# Patient Record
Sex: FEMALE | Race: WHITE | NOT HISPANIC OR LATINO | Employment: FULL TIME | URBAN - NONMETROPOLITAN AREA
[De-identification: names, ages, dates, MRNs, and addresses within clinical notes are randomized per-mention and may not be internally consistent; named-entity substitution may affect disease eponyms.]

---

## 2017-01-09 ENCOUNTER — OFFICE VISIT (OUTPATIENT)
Dept: NEUROSURGERY | Facility: CLINIC | Age: 52
End: 2017-01-09

## 2017-01-09 VITALS — HEIGHT: 64 IN | WEIGHT: 263 LBS | BODY MASS INDEX: 44.9 KG/M2

## 2017-01-09 DIAGNOSIS — M50.30 DDD (DEGENERATIVE DISC DISEASE), CERVICAL: ICD-10-CM

## 2017-01-09 DIAGNOSIS — M51.36 DDD (DEGENERATIVE DISC DISEASE), LUMBAR: ICD-10-CM

## 2017-01-09 DIAGNOSIS — M48.062 SPINAL STENOSIS, LUMBAR REGION, WITH NEUROGENIC CLAUDICATION: Primary | ICD-10-CM

## 2017-01-09 PROBLEM — M48.061 SPINAL STENOSIS, LUMBAR REGION, WITHOUT NEUROGENIC CLAUDICATION: Status: ACTIVE | Noted: 2017-01-09

## 2017-01-09 PROCEDURE — 99243 OFF/OP CNSLTJ NEW/EST LOW 30: CPT | Performed by: NEUROLOGICAL SURGERY

## 2017-01-09 RX ORDER — GABAPENTIN 600 MG/1
TABLET ORAL
COMMUNITY
Start: 2016-12-19

## 2017-01-09 RX ORDER — LEVOTHYROXINE SODIUM 0.03 MG/1
TABLET ORAL
COMMUNITY
Start: 2016-12-19

## 2017-01-09 RX ORDER — LISINOPRIL AND HYDROCHLOROTHIAZIDE 12.5; 1 MG/1; MG/1
TABLET ORAL
COMMUNITY
Start: 2016-12-19

## 2017-01-09 RX ORDER — MELOXICAM 7.5 MG/1
TABLET ORAL
COMMUNITY
Start: 2017-01-06

## 2017-01-09 RX ORDER — TRAMADOL HYDROCHLORIDE 50 MG/1
TABLET ORAL
COMMUNITY
Start: 2016-12-19

## 2017-01-09 NOTE — MR AVS SNAPSHOT
Naomi Reyes   2017 12:15 PM   Office Visit    Dept Phone:  [phone]   Encounter #:  [id number]    Provider:  Luis A Lopez MD   Department:  [facility]                Your Full Care Plan              Your Updated Medication List          This list is accurate as of: 17 11:59 PM.  Always use your most recent med list.                gabapentin 600 MG tablet   Commonly known as:  NEURONTIN       levothyroxine 25 MCG tablet   Commonly known as:  SYNTHROID, LEVOTHROID       lisinopril-hydrochlorothiazide 10-12.5 MG per tablet   Commonly known as:  PRINZIDE,ZESTORETIC       meloxicam 7.5 MG tablet   Commonly known as:  MOBIC       metFORMIN 500 MG tablet   Commonly known as:  GLUCOPHAGE       traMADol 50 MG tablet   Commonly known as:  ULTRAM               We Performed the Following     SCANNED - IMAGING     SCANNED - IMAGING       You Were Diagnosed With        Codes Comments    Spinal stenosis, lumbar region, with neurogenic claudication    -  Primary ICD-10-CM: M48.06  ICD-9-CM: 724.03 L4-5    DDD (degenerative disc disease), cervical     ICD-10-CM: M50.30  ICD-9-CM: 722.4     DDD (degenerative disc disease), lumbar     ICD-10-CM: M51.36  ICD-9-CM: 722.52       Instructions     None    Patient Instructions History      Upcoming Appointments       [employer] Signup     [facility] [employer] allows you to send messages to your doctor, view your test results, renew your prescriptions, schedule appointments, and more. To sign up, go to [employer] and click on the Sign Up Now link in the New User? box. Enter your [employer] Activation Code exactly as it appears below along with the last four digits of your Social Security Number and your Date of Birth () to complete the sign-up process. If you do not sign up before the expiration date, you must request a new code.    [employer] Activation Code: [email or web address]  Expires: 2017   "8:37 AM    If you have questions, you can email [email or web address] or call [phone] to talk to our [facility] staff. Remember, [employer] is NOT to be used for urgent needs. For medical emergencies, dial 911.               Other Info from Your Visit           Allergies     No Known Allergies      Reason for Visit     Neck Pain     Arm Pain     Back Pain     Leg Pain           Vital Signs     Height Weight Body Mass Index Smoking Status          64\" (162.6 cm) 263 lb (119 kg) 45.14 kg/m2 Never Smoker        Problems and Diagnoses Noted     Narrowing of spinal canal    Narrowing of spinal canal    -  Primary    Degeneration of intervertebral disc of cervical region        Degeneration of lumbar or lumbosacral intervertebral disc          Results     SCANNED - IMAGING           SCANNED - IMAGING               "

## 2017-01-09 NOTE — PROGRESS NOTES
Subjective   Patient ID: Naomi Reyes is a 51 y.o. female is being seen for consultation today at the request of LOUANN Howard  Chief Complaint: Back pain    History of Present Illness: The patient is a 51-year-old woman who works as a  in the pediatric section of the [employer] in [place].  Her complaint is neck and back pain that's been present for 2 years and gradually worsening.  She had physical therapy last spring for her neck and lower back.  She uses tramadol for the pain.  Her lower back pain is more pronounced in her neck pain.  Her lower back pain bothers her more with sitting and is somewhat relieved with standing.  She also has had numbness and tingling developing her right leg which was a cause of concern.  For this reason MRI of the lumbar spine was done.    Review of Radiographic Studies:  Cervical and lumbar MRI scans were reviewed.  The lumbar MRI scan shows a moderately severe stenosis at L4 5.  Cervical MRI scan shows degenerative disc changes at C4 5, C5 6, and C6 7, with reversal of the lordotic curve, but no stenosis or nerve root compression in the cervical spine.    The following portions of the patient's history were reviewed, updated as appropriate and approved: allergies, current medications, past family history, past medical history, past social history, past surgical history, review of systems and problem list.  Review of Systems   Musculoskeletal: Positive for arthralgias, back pain, neck pain and neck stiffness.   Neurological: Positive for numbness.       Objective     NEUROLOGICAL EXAMINATION:      MENTAL STATUS:  Alert and oriented.  Speech intact.  Recent and remote memory intact.      CRANIAL NERVES:  Cranial nerve II:  Visual fields are full to confrontation.  Cranial nerves III, IV and VI:  PERRLADC.  Extraocular movements are intact.  Nystagmus is not present.  Cranial nerve V:  Facial sensation is intact to light touch.  Cranial nerve VII:  Muscles  of facial expression reveal no asymmetry.  Cranial nerve VIII:  Hearing is intact to finger rub bilaterally.  Cranial nerves IX and X:  Palate elevates symmetrically.  Cranial nerve XI:  Shoulder shrug is intact.  Cranial nerve XII:  Tongue is midline without evidence of atrophy or fasciculation.    MUSCULOSKELETAL: Weight 263 pounds  SLR negative. Tony's test negative.    MOTOR:  Deltoid, biceps, triceps, and  strength intact.  No hand atrophy.  Hip flexion, knee extension, ankle dorsiflexion and plantar flexion intact. No tremor, spasticity, ataxia, or dysmetria.    SENSATION: Intact to touch upper and lower extremities.  Position sense intact.    REFLEXES:  DTR intact and symmetrical in upper and lower extremities. Negative Babinski bilaterally    Assessment   Lumbar stenosis L4 5 with mild right lumbar radiculopathy, no neurogenic claudication, and back pain mostly related to the degenerative facet arthritis.  Cervical degenerative arthritis and degenerative disc disease C4 through C7 without stenosis or radiculopathy.       Plan   Surgery is unnecessary for the stenosis at L4 5 this point.  Particularly because the pain that she has in her lumbar spine is nonradicular for the most part and is alleviated by standing.  The right lumbar radiculopathy is minimal and does not require surgery at this time.        Luis A Lopez MD

## 2022-08-31 ENCOUNTER — HOSPITAL ENCOUNTER (OUTPATIENT)
Dept: HOSPITAL 79 - OPSV2 | Age: 57
End: 2022-08-31
Attending: ORTHOPAEDIC SURGERY
Payer: MEDICARE

## 2022-08-31 DIAGNOSIS — M17.12: ICD-10-CM

## 2022-08-31 DIAGNOSIS — Z01.818: Primary | ICD-10-CM

## 2022-08-31 LAB
BUN/CREATININE RATIO: 23 (ref 0–10)
HGB BLD-MCNC: 11.7 GM/DL (ref 12.3–15.3)
RED BLOOD COUNT: 4.44 M/UL (ref 4–5.1)
WHITE BLOOD COUNT: 7.5 K/UL (ref 4.5–11)

## 2022-09-15 ENCOUNTER — HOSPITAL ENCOUNTER (OUTPATIENT)
Dept: HOSPITAL 79 - OR | Age: 57
Discharge: HOME | End: 2022-09-15
Attending: ORTHOPAEDIC SURGERY
Payer: MEDICARE

## 2022-09-15 DIAGNOSIS — K21.9: ICD-10-CM

## 2022-09-15 DIAGNOSIS — E03.9: ICD-10-CM

## 2022-09-15 DIAGNOSIS — G89.18: ICD-10-CM

## 2022-09-15 DIAGNOSIS — Z79.84: ICD-10-CM

## 2022-09-15 DIAGNOSIS — Z79.82: ICD-10-CM

## 2022-09-15 DIAGNOSIS — E11.9: ICD-10-CM

## 2022-09-15 DIAGNOSIS — I10: ICD-10-CM

## 2022-09-15 DIAGNOSIS — E66.9: ICD-10-CM

## 2022-09-15 DIAGNOSIS — M17.12: Primary | ICD-10-CM

## 2022-09-15 DIAGNOSIS — Z79.899: ICD-10-CM

## 2022-09-15 DIAGNOSIS — Z79.891: ICD-10-CM

## 2022-09-15 DIAGNOSIS — E78.5: ICD-10-CM

## 2022-09-15 LAB — BUN/CREATININE RATIO: 24 (ref 0–10)

## 2022-09-15 PROCEDURE — C1776 JOINT DEVICE (IMPLANTABLE): HCPCS

## 2022-09-15 PROCEDURE — C1713 ANCHOR/SCREW BN/BN,TIS/BN: HCPCS
